# Patient Record
Sex: MALE | Race: WHITE | NOT HISPANIC OR LATINO | Employment: UNEMPLOYED | URBAN - METROPOLITAN AREA
[De-identification: names, ages, dates, MRNs, and addresses within clinical notes are randomized per-mention and may not be internally consistent; named-entity substitution may affect disease eponyms.]

---

## 2021-12-17 ENCOUNTER — TRANSCRIBE ORDERS (OUTPATIENT)
Dept: ADMINISTRATIVE | Facility: HOSPITAL | Age: 31
End: 2021-12-17

## 2021-12-17 DIAGNOSIS — M51.36 DEGENERATION OF LUMBAR INTERVERTEBRAL DISC: Primary | ICD-10-CM

## 2021-12-17 DIAGNOSIS — M43.06 SPONDYLOLYSIS, LUMBAR REGION: ICD-10-CM

## 2021-12-17 DIAGNOSIS — M54.42 CHRONIC LEFT-SIDED LOW BACK PAIN WITH LEFT-SIDED SCIATICA: ICD-10-CM

## 2021-12-17 DIAGNOSIS — G89.29 CHRONIC LEFT-SIDED LOW BACK PAIN WITH LEFT-SIDED SCIATICA: ICD-10-CM

## 2022-01-10 ENCOUNTER — HOSPITAL ENCOUNTER (OUTPATIENT)
Dept: GENERAL RADIOLOGY | Facility: HOSPITAL | Age: 32
Discharge: HOME | End: 2022-01-10

## 2022-01-10 ENCOUNTER — APPOINTMENT (OUTPATIENT)
Dept: CT IMAGING | Facility: HOSPITAL | Age: 32
End: 2022-01-10

## 2022-02-07 ENCOUNTER — HOSPITAL ENCOUNTER (OUTPATIENT)
Dept: GENERAL RADIOLOGY | Facility: HOSPITAL | Age: 32
Discharge: HOME | End: 2022-02-07

## 2022-02-07 ENCOUNTER — HOSPITAL ENCOUNTER (OUTPATIENT)
Dept: CT IMAGING | Facility: HOSPITAL | Age: 32
Discharge: HOME | End: 2022-02-07

## 2022-02-07 VITALS
TEMPERATURE: 97.4 F | BODY MASS INDEX: 32.6 KG/M2 | HEART RATE: 74 BPM | OXYGEN SATURATION: 100 % | WEIGHT: 246 LBS | SYSTOLIC BLOOD PRESSURE: 133 MMHG | HEIGHT: 73 IN | DIASTOLIC BLOOD PRESSURE: 86 MMHG | RESPIRATION RATE: 16 BRPM

## 2022-02-07 DIAGNOSIS — M51.36 DEGENERATION OF LUMBAR INTERVERTEBRAL DISC: ICD-10-CM

## 2022-02-07 DIAGNOSIS — M43.06 SPONDYLOLYSIS, LUMBAR REGION: ICD-10-CM

## 2022-02-07 DIAGNOSIS — M54.42 CHRONIC LEFT-SIDED LOW BACK PAIN WITH LEFT-SIDED SCIATICA: ICD-10-CM

## 2022-02-07 DIAGNOSIS — G89.29 CHRONIC LEFT-SIDED LOW BACK PAIN WITH LEFT-SIDED SCIATICA: ICD-10-CM

## 2022-02-07 PROCEDURE — 62304 MYELOGRAPHY LUMBAR INJECTION: CPT

## 2022-02-07 PROCEDURE — 72132 CT LUMBAR SPINE W/DYE: CPT

## 2022-02-07 PROCEDURE — 0 IOPAMIDOL 41 % SOLUTION: Performed by: PHYSICIAN ASSISTANT

## 2022-02-07 PROCEDURE — 72240 MYELOGRAPHY NECK SPINE: CPT

## 2022-02-07 RX ORDER — LANSOPRAZOLE 30 MG/1
30 CAPSULE, DELAYED RELEASE ORAL DAILY
COMMUNITY

## 2022-02-07 RX ORDER — OXYCODONE AND ACETAMINOPHEN 7.5; 325 MG/1; MG/1
1 TABLET ORAL 3 TIMES DAILY
COMMUNITY

## 2022-02-07 RX ORDER — AMLODIPINE BESYLATE AND BENAZEPRIL HYDROCHLORIDE 5; 10 MG/1; MG/1
1 CAPSULE ORAL DAILY
COMMUNITY

## 2022-02-07 RX ORDER — GABAPENTIN 100 MG/1
200 CAPSULE ORAL NIGHTLY
COMMUNITY

## 2022-02-07 RX ORDER — HYDROCHLOROTHIAZIDE 25 MG/1
50 TABLET ORAL DAILY
COMMUNITY

## 2022-02-07 RX ADMIN — IOPAMIDOL 20 ML: 408 INJECTION, SOLUTION INTRATHECAL at 10:16

## 2022-02-07 NOTE — POST-PROCEDURE NOTE
Radiology Procedure    Pre-procedure: procedure, risks discussed with patient. Patient indicated understanding and consented to procedure.     Procedure Performed: lumbar myelogram     IV Sedation and/or Anesthesia:  No    Complications: none    Preliminary Findings: pending    Specimen Removed: none    Estimated Blood Loss:  0ml    Post-Procedure Diagnosis: pending    Post-Procedure Plan: CT L spine, encourage fluids, bed rest x 2 hours    Standard Discharge Instructions Given:yes     MANOLO Andrea  02/07/22  10:10 EST